# Patient Record
Sex: MALE | Race: BLACK OR AFRICAN AMERICAN | Employment: FULL TIME | URBAN - METROPOLITAN AREA
[De-identification: names, ages, dates, MRNs, and addresses within clinical notes are randomized per-mention and may not be internally consistent; named-entity substitution may affect disease eponyms.]

---

## 2019-01-31 ENCOUNTER — APPOINTMENT (OUTPATIENT)
Dept: GENERAL RADIOLOGY | Age: 39
End: 2019-01-31
Attending: EMERGENCY MEDICINE
Payer: SELF-PAY

## 2019-01-31 ENCOUNTER — HOSPITAL ENCOUNTER (EMERGENCY)
Age: 39
Discharge: HOME | End: 2019-01-31
Attending: EMERGENCY MEDICINE
Payer: SELF-PAY

## 2019-01-31 VITALS
BODY MASS INDEX: 26.84 KG/M2 | HEART RATE: 60 BPM | RESPIRATION RATE: 17 BRPM | WEIGHT: 167 LBS | TEMPERATURE: 98.1 F | OXYGEN SATURATION: 100 % | HEIGHT: 66 IN | SYSTOLIC BLOOD PRESSURE: 125 MMHG | DIASTOLIC BLOOD PRESSURE: 85 MMHG

## 2019-01-31 DIAGNOSIS — S60.221D CONTUSION OF RIGHT HAND, SUBSEQUENT ENCOUNTER: Primary | ICD-10-CM

## 2019-01-31 PROCEDURE — 73130 X-RAY EXAM OF HAND: CPT

## 2019-01-31 PROCEDURE — 99283 EMERGENCY DEPT VISIT LOW MDM: CPT

## 2019-01-31 NOTE — DISCHARGE INSTRUCTIONS
Patient Education        Hand Bruises: Care Instructions  Your Care Instructions  Bruises, or contusions, can happen as a result of an impact or fall. Most people think of a bruise as a black-and-blue spot. This happens when small blood vessels get torn and leak blood under the skin. The bruise may turn purplish black, reddish blue, or yellowish green as it heals. But bones and muscles can also get bruised. This may damage the hand but not cause a bruise that you can see. Most bruises aren't serious and will go away on their own in 2 to 4 weeks. But sometimes a more serious hand injury might not heal on its own. Tell your doctor if you have new symptoms or your injury is not getting better over time. You may have tests to see if you have bone or nerve damage. These tests may include X-rays, a CT scan, or an MRI. If you damaged bones or muscles, you may need more treatment. The doctor has checked you carefully, but problems can develop later. If you notice any problems or new symptoms, get medical treatment right away. Follow-up care is a key part of your treatment and safety. Be sure to make and go to all appointments, and call your doctor if you are having problems. It's also a good idea to know your test results and keep a list of the medicines you take. How can you care for yourself at home? · Put ice or a cold pack on the hand for 10 to 20 minutes at a time. Put a thin cloth between the ice and your skin. · Prop up your hand on a pillow when you ice it or anytime you sit or lie down during the next 3 days. Try to keep your hand above the level of your heart. This will help reduce swelling. · Be safe with medicines. Read and follow all instructions on the label. ? If the doctor gave you a prescription medicine for pain, take it as prescribed. ? If you are not taking a prescription pain medicine, ask your doctor if you can take an over-the-counter medicine.   · Be sure to follow your doctor's advice about moving and exercising your injured hand. When should you call for help? Call your doctor now or seek immediate medical care if:    · Your pain gets worse.     · You have new or worse swelling.     · You have tingling, weakness, or numbness in the area near the bruise.     · The area near the bruise is cold or pale.     · You have symptoms of infection, such as:  ? Increased pain, swelling, warmth, or redness. ? Red streaks leading from the area. ? Pus draining from the area. ? A fever.    Watch closely for changes in your health, and be sure to contact your doctor if:    · You do not get better as expected. Where can you learn more? Go to http://janeen-emi.info/. Enter D655 in the search box to learn more about \"Hand Bruises: Care Instructions. \"  Current as of: September 23, 2018  Content Version: 11.9  © 2420-3285 [employer], [facility]. Care instructions adapted under license by [employer] (which disclaims liability or warranty for this information). If you have questions about a medical condition or this instruction, always ask your healthcare professional. [place] [address] any warranty or liability for your use of this information.

## 2019-01-31 NOTE — ED PROVIDER NOTES
EMERGENCY DEPARTMENT HISTORY AND PHYSICAL EXAM 
 
 
Date: 1/31/2019 Patient Name: Kelly Viveros History of Presenting Illness Chief Complaint Patient presents with  
 Hand Pain  
  pt reported he he hit metal on his rt hand at work and pain since. History Provided By: Patient HPI: Kelly Viveros, 45 y.o. male with no significant PMHx presents ambulatory to the ED for second evaluation constant R hand pain since injuring his hand at work 2 days ago. He states he was pulling on shrink wrap when it gave way and the lateral portion of his hand hit a metal pole. Pt reports noticing the \"bone moving. \" Pt states he was seen at an outside hospital yesterday where he had x-rays that showed no acute fx. However, pt believes they were \"lying to [him]\" because they told him to come back in a week. He reports taking Naproxen without any improvement. Pt notes pain is exacerbated by moving his R pinky finger. He denies any recent numbness/tingling, weakness, fevers, or any other complaints. There are no other complaints, changes, or physical findings at this time. No current facility-administered medications on file prior to encounter. No current outpatient medications on file prior to encounter. Past History Past Medical History: 
History reviewed. No pertinent past medical history. Past Surgical History: 
History reviewed. No pertinent surgical history. Family History: 
History reviewed. No pertinent family history. Social History: 
Social History Tobacco Use  Smoking status: Never Smoker  Smokeless tobacco: Never Used Substance Use Topics  Alcohol use: No  
  Frequency: Never  Drug use: No  
 
 
Allergies: 
No Known Allergies Review of Systems Review of Systems Constitutional: Negative for chills and fever. HENT: Negative for congestion, rhinorrhea, sneezing and sore throat. Eyes: Negative for redness and visual disturbance. Respiratory: Negative for shortness of breath. Cardiovascular: Negative for chest pain and leg swelling. Gastrointestinal: Negative for abdominal pain, nausea and vomiting. Genitourinary: Negative for difficulty urinating and frequency. Musculoskeletal: Negative for back pain, myalgias and neck stiffness. +R hand pain Skin: Negative for rash. Neurological: Negative for dizziness, syncope, weakness and headaches. Hematological: Negative for adenopathy. All other systems reviewed and are negative. Physical Exam  
Physical Exam  
Constitutional: He is oriented to person, place, and time. He appears well-developed and well-nourished. HENT:  
Head: Normocephalic and atraumatic. Mouth/Throat: Oropharynx is clear and moist and mucous membranes are normal.  
Eyes: EOM are normal.  
Neck: Normal range of motion and full passive range of motion without pain. Neck supple. Cardiovascular: Normal rate, regular rhythm, normal heart sounds, intact distal pulses and normal pulses. No murmur heard. Pulmonary/Chest: Effort normal and breath sounds normal. No respiratory distress. He exhibits no tenderness. Abdominal: Soft. Normal appearance and bowel sounds are normal. There is no tenderness. There is no rebound and no guarding. Musculoskeletal:  
TTP over 5th metacarpal and 5th phalanx distal to PIP joint. Mild swelling. No palpable deformity Neurological: He is alert and oriented to person, place, and time. He has normal strength. Skin: Skin is warm, dry and intact. No rash noted. No erythema. Psychiatric: He has a normal mood and affect. His speech is normal and behavior is normal. Judgment and thought content normal.  
Nursing note and vitals reviewed. Diagnostic Study Results Radiologic Studies -  
XR HAND RT MIN 3 V Final Result EXAM: XR HAND RT MIN 3 V 
  INDICATION: right hand injury Tuesday, TTP over little finger from PIP joint to 
 mid metacarpal, fracture? . Right hand pain since injury 2 days ago. 
  
COMPARISON: None. 
  
FINDINGS: Three views of the right hand demonstrate no fracture or other acute 
osseous or articular abnormality. Limited evaluation of the small finger due to 
overlapping fingers on the oblique and lateral view. No evidence of fracture. Joints are within normal limits. Bone mineralization is within normal limits. 
  
IMPRESSION IMPRESSION:  
  
 Limited study. No evidence of fracture. Medical Decision Making I am the first provider for this patient. I reviewed the vital signs, available nursing notes, past medical history, past surgical history, family history and social history. Vital Signs-Reviewed the patient's vital signs. Patient Vitals for the past 12 hrs: 
 Temp Pulse Resp BP SpO2  
01/31/19 0720 98.1 °F (36.7 °C) 60 17 125/85 100 % Records Reviewed: Nursing Notes, Old Medical Records, Previous Radiology Studies and Previous Laboratory Studies Provider Notes (Medical Decision Making): DDx: sprain, strain, fx, contusion ED Course:  
Initial assessment performed. The patients presenting problems have been discussed, and they are in agreement with the care plan formulated and outlined with them. I have encouraged them to ask questions as they arise throughout their visit. 8:00 AM  
Discussed plan. Pt agrees. Will discharge him home with hand surgery f/u. Discharge Note: 
8:11 AM 
The patient has been re-evaluated and is ready for discharge. Reviewed available results with patient. Counseled patient on diagnosis and care plan. Patient has expressed understanding, and all questions have been answered. Patient agrees with plan and agrees to follow up as recommended, or to return to the ED if their symptoms worsen. Discharge instructions have been provided and explained to the patient, along with reasons to return to the ED.  
 
PLAN: 
 1. There are no discharge medications for this patient. 2.  
Follow-up Information Follow up With Specialties Details Why Contact Info Pablo Rosas MD Hand Surgery, General Surgery Schedule an appointment as soon as possible for a visit or with the doctor your worker's comp recommends you use [address] [facility] [facility] 7 [address] [facility] EMERGENCY DEPT Emergency Medicine  As needed, If symptoms worsen [address] Return to ED if worse Diagnosis Clinical Impression: 1. Contusion of right hand, subsequent encounter Attestations: This note is prepared by Shedrick Meigs, acting as Scribe for Wilian Steel MD. 
 
The scribe's documentation has been prepared under my direction and personally reviewed by me in its entirety. I confirm that the note above accurately reflects all work, treatment, procedures, and medical decision making performed by me. Wilian Steel MD 
 
 
 
This note will not be viewable in [address]

## 2019-01-31 NOTE — ED NOTES
Patient presents to the ED with c/o right hand pain x2 days. Pt reports being at work and he pulled on shrink wrap and it gave way and his hand hit something metal. Pt reports taking naproxen. Pt is alert and oriented. Pt skin is warm and dry. Pt is ambulatory independently. Pt right 5th digit has trace swelling. No obvious deformity. Emergency Department Nursing Plan of Care The Nursing Plan of Care is developed from the Nursing assessment and Emergency Department Attending provider initial evaluation. The plan of care may be reviewed in the ED Provider note. The Plan of Care was developed with the following considerations:  
Patient / Family readiness to learn indicated by:verbalized understanding Persons(s) to be included in education: patient Barriers to Learning/Limitations:No 
 
Signed Gladys Hunt 1/31/2019   7:30 AM

## 2022-07-16 ENCOUNTER — HOSPITAL ENCOUNTER (EMERGENCY)
Age: 42
Discharge: HOME | End: 2022-07-16
Attending: EMERGENCY MEDICINE
Payer: MEDICAID

## 2022-07-16 VITALS
TEMPERATURE: 98.2 F | HEART RATE: 77 BPM | RESPIRATION RATE: 17 BRPM | HEIGHT: 67 IN | BODY MASS INDEX: 23.46 KG/M2 | WEIGHT: 149.5 LBS | OXYGEN SATURATION: 98 % | SYSTOLIC BLOOD PRESSURE: 154 MMHG | DIASTOLIC BLOOD PRESSURE: 89 MMHG

## 2022-07-16 DIAGNOSIS — Z20.2 POSSIBLE EXPOSURE TO STD: Primary | ICD-10-CM

## 2022-07-16 DIAGNOSIS — N48.9 PENILE LESION: ICD-10-CM

## 2022-07-16 LAB
APPEARANCE UR: CLEAR
BACTERIA URNS QL MICRO: NEGATIVE /HPF
BILIRUB UR QL: NEGATIVE
COLOR UR: ABNORMAL
EPITH CASTS URNS QL MICRO: ABNORMAL /LPF
GLUCOSE UR STRIP.AUTO-MCNC: NEGATIVE MG/DL
HGB UR QL STRIP: NEGATIVE
KETONES UR QL STRIP.AUTO: ABNORMAL MG/DL
LEUKOCYTE ESTERASE UR QL STRIP.AUTO: ABNORMAL
NITRITE UR QL STRIP.AUTO: NEGATIVE
PH UR STRIP: 5.5 [PH] (ref 5–8)
PROT UR STRIP-MCNC: NEGATIVE MG/DL
RBC #/AREA URNS HPF: ABNORMAL /HPF (ref 0–5)
SP GR UR REFRACTOMETRY: 1.02
UA: UC IF INDICATED,UAUC: ABNORMAL
UROBILINOGEN UR QL STRIP.AUTO: 1 EU/DL (ref 0.2–1)
WBC URNS QL MICRO: ABNORMAL /HPF (ref 0–4)

## 2022-07-16 PROCEDURE — 81001 URINALYSIS AUTO W/SCOPE: CPT

## 2022-07-16 PROCEDURE — 87255 GENET VIRUS ISOLATE HSV: CPT

## 2022-07-16 PROCEDURE — 87491 CHLMYD TRACH DNA AMP PROBE: CPT

## 2022-07-16 PROCEDURE — 74011000250 HC RX REV CODE- 250: Performed by: NURSE PRACTITIONER

## 2022-07-16 PROCEDURE — 96372 THER/PROPH/DIAG INJ SC/IM: CPT

## 2022-07-16 PROCEDURE — 99284 EMERGENCY DEPT VISIT MOD MDM: CPT

## 2022-07-16 PROCEDURE — 74011250636 HC RX REV CODE- 250/636: Performed by: NURSE PRACTITIONER

## 2022-07-16 PROCEDURE — 74011250637 HC RX REV CODE- 250/637: Performed by: NURSE PRACTITIONER

## 2022-07-16 RX ORDER — AZITHROMYCIN 500 MG/1
1000 TABLET, FILM COATED ORAL
Status: COMPLETED | OUTPATIENT
Start: 2022-07-16 | End: 2022-07-16

## 2022-07-16 RX ORDER — ACYCLOVIR 800 MG/1
800 TABLET ORAL
Qty: 35 TABLET | Refills: 0 | Status: SHIPPED | OUTPATIENT
Start: 2022-07-16 | End: 2022-07-23

## 2022-07-16 RX ADMIN — LIDOCAINE HYDROCHLORIDE 500 MG: 10 INJECTION, SOLUTION EPIDURAL; INFILTRATION; INTRACAUDAL; PERINEURAL at 13:50

## 2022-07-16 RX ADMIN — AZITHROMYCIN MONOHYDRATE 1000 MG: 500 TABLET ORAL at 13:50

## 2022-07-16 NOTE — LETTER
7/17/2022      [address] [place] [id number]      Dear Mr. Iwona Vinson were seen in the Emergency Department of [address] [place] [address] on 7-16 and had lab and/or radiology tests performed. We would like to discuss these results with you . Please call the Emergency Department at your earliest convenience at [phone], to speak with one of our providers. The chlamydia from your Emergency Department visit on 7-16 was positive. You were treated appropriately during your visit. No further treatment is required. Your partner needs to be treated. If you have any questions please contact the Emergency Department at [phone].       Sincerely,    Barbara Mckoy NP    [facility] EMERGENCY DEPT  [address] [facility] [id number] [phone]

## 2022-07-16 NOTE — ED PROVIDER NOTES
EMERGENCY DEPARTMENT HISTORY AND PHYSICAL EXAM    Date: 7/16/2022  Patient Name: Annika Holland    History of Presenting Illness     Chief Complaint   Patient presents with    Exposure to STD         History Provided By: Patient    Chief Complaint:penile lesion  Duration: 3 Days  Timing:  Acute  Location: penis  Quality: soreness  Severity: Mild  Modifying Factors: none  Associated Symptoms: denies any other associated signs or symptoms      HPI: Annika Holland is a 43 y.o. male with a PMH of No significant past medical history who presents with   No lesion cute onset 3 days ago. Patient states his partner called him and told him she was tested for everything. Patient states he would like to be treated for STDs. He denies penile discharge. PCP: None    Current Outpatient Medications   Medication Sig Dispense Refill    acyclovir (ZOVIRAX) 800 mg tablet Take 1 Tablet by mouth five (5) times daily for 7 days. 35 Tablet 0       Past History     Past Medical History:  History reviewed. No pertinent past medical history. Past Surgical History:  History reviewed. No pertinent surgical history. Family History:  History reviewed. No pertinent family history. Social History:  Social History     Tobacco Use    Smoking status: Never Smoker    Smokeless tobacco: Never Used   Substance Use Topics    Alcohol use: No    Drug use: No       Allergies:  No Known Allergies      Review of Systems   Review of Systems   Constitutional: Negative for chills, fatigue and fever. HENT: Negative for congestion and sore throat. Eyes: Negative for redness. Respiratory: Negative for cough, chest tightness and wheezing. Cardiovascular: Negative for chest pain. Gastrointestinal: Negative for abdominal pain. Genitourinary: Positive for genital sores. Negative for dysuria. Musculoskeletal: Negative for myalgias, neck pain and neck stiffness. Skin: Negative for rash.    Neurological: Negative for dizziness, syncope, weakness, light-headedness, numbness and headaches. All other systems reviewed and are negative. Physical Exam     Vitals:    07/16/22 1257 07/16/22 1344   BP:  (!) 154/89   Pulse: 77    Resp: 17    Temp: 98.2 °F (36.8 °C)    SpO2: 98%    Weight: 67.8 kg (149 lb 8 oz)    Height: 5' 7\" (1.702 m)      Physical Exam  Vitals and nursing note reviewed. Exam conducted with a chaperone present. Constitutional:       Appearance: Normal appearance. He is well-developed. HENT:      Head: Normocephalic and atraumatic. Right Ear: External ear normal.      Left Ear: External ear normal.      Nose: Nose normal.      Mouth/Throat:      Mouth: Mucous membranes are moist.   Eyes:      General:         Right eye: No discharge. Left eye: No discharge. Conjunctiva/sclera: Conjunctivae normal.   Cardiovascular:      Rate and Rhythm: Normal rate and regular rhythm. Heart sounds: Normal heart sounds. Pulmonary:      Effort: Pulmonary effort is normal. No respiratory distress. Breath sounds: Normal breath sounds. No wheezing. Abdominal:      General: Bowel sounds are normal.      Palpations: Abdomen is soft. Tenderness: There is no abdominal tenderness. Genitourinary:      Musculoskeletal:         General: Normal range of motion. Cervical back: Normal range of motion and neck supple. Lymphadenopathy:      Cervical: No cervical adenopathy. Skin:     General: Skin is warm and dry. Neurological:      Mental Status: He is alert and oriented to person, place, and time. Cranial Nerves: No cranial nerve deficit. Psychiatric:         Behavior: Behavior normal.         Thought Content: Thought content normal.         Judgment: Judgment normal.           Diagnostic Study Results     Labs -   No results found for this or any previous visit (from the past 12 hour(s)).     Radiologic Studies -   No orders to display     CT Results  (Last 48 hours)    None        CXR Results (Last 48 hours)    None            Medical Decision Making   I am the first provider for this patient. I reviewed the vital signs, available nursing notes, past medical history, past surgical history, family history and social history. Vital Signs-Reviewed the patient's vital signs. Records Reviewed: Nursing Notes    Provider Notes (Medical Decision Making):   DDX genital herpes cyst abscess STI ALEXANDRA UTI          Disposition:  home    DISCHARGE NOTE:     I have discussed with patient their diagnosis, treatment, and follow up plan. The patient agrees to follow up as outlined in discharge paperwork and also to return to the ED with any worsening. Odilon Cage NP        Follow-up Information     Follow up With Specialties Details Why Contact Info [facility]  In 1 week  [address] [id number] [phone]          Current Discharge Medication List      START taking these medications    Details   acyclovir (ZOVIRAX) 800 mg tablet Take 1 Tablet by mouth five (5) times daily for 7 days. Qty: 35 Tablet, Refills: 0  Start date: 7/16/2022, End date: 7/23/2022             Procedures:  Procedures    Please note that this dictation was completed with Dragon, computer voice recognition software. Quite often unanticipated grammatical, syntax, homophones, and other interpretive errors are inadvertently transcribed by the computer software. Please disregard these errors. Additionally, please excuse any errors that have escaped final proofreading. Diagnosis     Clinical Impression:   1. Possible exposure to STD    2.  Penile lesion

## 2022-07-16 NOTE — ED TRIAGE NOTES
CC of exposure to STD, pt states that he received a call from partner stating that he has an STD but didn't state what. Pt endorses urinary frequency and penile lesions. Pt denies penile discharge.

## 2022-07-17 LAB
C TRACH DNA SPEC QL NAA+PROBE: POSITIVE
N GONORRHOEA DNA SPEC QL NAA+PROBE: NEGATIVE
SAMPLE TYPE: ABNORMAL
SERVICE CMNT-IMP: ABNORMAL
SPECIMEN SOURCE: ABNORMAL

## 2022-07-19 LAB
HSV SPEC CULT: NEGATIVE
SPECIMEN SOURCE: NORMAL

## 2022-11-15 ENCOUNTER — HOSPITAL ENCOUNTER (EMERGENCY)
Age: 42
Discharge: HOME | End: 2022-11-15
Attending: EMERGENCY MEDICINE
Payer: MEDICAID

## 2022-11-15 VITALS
SYSTOLIC BLOOD PRESSURE: 120 MMHG | HEIGHT: 67 IN | WEIGHT: 154.1 LBS | TEMPERATURE: 98.2 F | OXYGEN SATURATION: 99 % | DIASTOLIC BLOOD PRESSURE: 83 MMHG | HEART RATE: 65 BPM | BODY MASS INDEX: 24.19 KG/M2 | RESPIRATION RATE: 16 BRPM

## 2022-11-15 DIAGNOSIS — L03.012 PARONYCHIA OF FINGER OF LEFT HAND: Primary | ICD-10-CM

## 2022-11-15 PROCEDURE — 74011250637 HC RX REV CODE- 250/637: Performed by: PHYSICIAN ASSISTANT

## 2022-11-15 PROCEDURE — 99283 EMERGENCY DEPT VISIT LOW MDM: CPT

## 2022-11-15 PROCEDURE — 74011000250 HC RX REV CODE- 250: Performed by: PHYSICIAN ASSISTANT

## 2022-11-15 PROCEDURE — 75810000289 HC I&D ABSCESS SIMP/COMP/MULT

## 2022-11-15 RX ORDER — SULFAMETHOXAZOLE AND TRIMETHOPRIM 800; 160 MG/1; MG/1
1 TABLET ORAL 2 TIMES DAILY
Qty: 14 TABLET | Refills: 0 | Status: SHIPPED | OUTPATIENT
Start: 2022-11-15 | End: 2022-11-22

## 2022-11-15 RX ORDER — BACITRACIN 500 UNIT/G
1 PACKET (EA) TOPICAL
Status: COMPLETED | OUTPATIENT
Start: 2022-11-15 | End: 2022-11-15

## 2022-11-15 RX ORDER — LIDOCAINE HYDROCHLORIDE 10 MG/ML
1 INJECTION, SOLUTION EPIDURAL; INFILTRATION; INTRACAUDAL; PERINEURAL ONCE
Status: COMPLETED | OUTPATIENT
Start: 2022-11-15 | End: 2022-11-15

## 2022-11-15 RX ORDER — HYDROCODONE BITARTRATE AND ACETAMINOPHEN 5; 325 MG/1; MG/1
1 TABLET ORAL
Status: COMPLETED | OUTPATIENT
Start: 2022-11-15 | End: 2022-11-15

## 2022-11-15 RX ORDER — NAPROXEN 500 MG/1
500 TABLET ORAL 2 TIMES DAILY WITH MEALS
Qty: 20 TABLET | Refills: 0 | Status: SHIPPED | OUTPATIENT
Start: 2022-11-15

## 2022-11-15 RX ORDER — CEPHALEXIN 500 MG/1
500 CAPSULE ORAL 4 TIMES DAILY
Qty: 28 CAPSULE | Refills: 0 | Status: SHIPPED | OUTPATIENT
Start: 2022-11-15 | End: 2022-11-22

## 2022-11-15 RX ORDER — SULFAMETHOXAZOLE AND TRIMETHOPRIM 800; 160 MG/1; MG/1
1 TABLET ORAL
Status: COMPLETED | OUTPATIENT
Start: 2022-11-15 | End: 2022-11-15

## 2022-11-15 RX ORDER — CEPHALEXIN 500 MG/1
500 CAPSULE ORAL
Status: COMPLETED | OUTPATIENT
Start: 2022-11-15 | End: 2022-11-15

## 2022-11-15 RX ADMIN — LIDOCAINE HYDROCHLORIDE 1 ML: 10 INJECTION, SOLUTION EPIDURAL; INFILTRATION; INTRACAUDAL; PERINEURAL at 21:41

## 2022-11-15 RX ADMIN — BACITRACIN 1 PACKET: 500 OINTMENT TOPICAL at 22:07

## 2022-11-15 RX ADMIN — HYDROCODONE BITARTRATE AND ACETAMINOPHEN 1 TABLET: 5; 325 TABLET ORAL at 22:07

## 2022-11-15 RX ADMIN — SULFAMETHOXAZOLE AND TRIMETHOPRIM 1 TABLET: 800; 160 TABLET ORAL at 21:41

## 2022-11-15 RX ADMIN — CEPHALEXIN 500 MG: 500 CAPSULE ORAL at 21:41

## 2022-11-15 NOTE — Clinical Note
[address] EMERGENCY DEPT  [address] [facility] [id number] [phone]    Work/School Note    Date: 11/15/2022    To Whom It May concern:    Shelly Dietz was seen and treated today in the emergency room by the following provider(s):  Attending Provider: Tana Potts MD  Physician Assistant: Aguilar Francois. Shelly Dietz is excused from work/school on 11/15/22 and 11/16/22. He is medically clear to return to work/school on 11/17/2022.        Sincerely,          PHILL Kaur

## 2022-11-16 NOTE — ED TRIAGE NOTES
Pt appears to have paronychia around left index finger. Pt states he does bite his nails. Pt denies taking medication for pain.

## 2022-11-16 NOTE — ED PROVIDER NOTES
EMERGENCY DEPARTMENT HISTORY AND PHYSICAL EXAM      Date: 11/15/2022  Patient Name: Angela Quiroz    History of Presenting Illness     Chief Complaint   Patient presents with    Finger Pain     With swelling x2 days. Left index finger       History Provided By: Patient    HPI: Angela Quiroz, 43 y.o. male otherwise healthy with no reported chronic medical conditions, presents  to the ED with cc of mild to moderate, constant left index finger pain and swelling for the past 3 days. Endorses mild to moderate, constant aching and throbbing pain, localized to the left distal finger. Pain is worse with palpation. Denies any drainage or bleeding. Denies decrease in ROM. Denies numbness or weakness. Denies fevers or chills. Denies trauma or injuries, but states he does bite his nails. Denies history of diabetes, HIV or IV drug use. No symptomatic management prior to arrival. No additional exacerbating or alleviating factors. No other complaints at this time. There are no other complaints, changes, or physical findings at this time. PCP: None      Past History     Past Medical History:  History reviewed. No pertinent past medical history. Past Surgical History:  No past surgical history on file. Family History:  History reviewed. No pertinent family history. Social History:  Social History     Tobacco Use    Smoking status: Never    Smokeless tobacco: Never   Substance Use Topics    Alcohol use: No    Drug use: No       Allergies:  No Known Allergies  Review of Systems   Review of Systems   Constitutional:  Negative for appetite change, chills and fever. HENT:  Negative for congestion. Eyes:  Negative for pain. Respiratory:  Negative for cough and shortness of breath. Cardiovascular:  Negative for chest pain. Gastrointestinal:  Negative for abdominal pain, constipation, diarrhea, nausea and vomiting. Genitourinary:  Negative for difficulty urinating, dysuria and frequency. Musculoskeletal:  Positive for arthralgias. Skin:  Negative for rash. Swelling to end of left index finger around nail   Neurological:  Negative for syncope and headaches. All other systems reviewed and are negative. Physical Exam   Physical Exam  Vitals and nursing note reviewed. Constitutional:       General: He is not in acute distress. Appearance: Normal appearance. He is not ill-appearing or toxic-appearing. Comments: 43 y.o. male   HENT:      Head: Normocephalic and atraumatic. Right Ear: External ear normal.      Left Ear: External ear normal.      Nose: Nose normal.      Mouth/Throat:      Mouth: Mucous membranes are moist.   Eyes:      Extraocular Movements: Extraocular movements intact. Conjunctiva/sclera: Conjunctivae normal.   Cardiovascular:      Rate and Rhythm: Normal rate. Pulses: Normal pulses. Pulmonary:      Effort: Pulmonary effort is normal. No respiratory distress. Abdominal:      General: Abdomen is flat. There is no distension. Musculoskeletal:         General: Normal range of motion. Cervical back: Normal range of motion. Comments: Paronychia to left index finger. No fat pad swelling or evidence of felon. Distal sensation equal and intact. Brisk capillary refill. Good range of motion. No active drainage or bleeding. No streaking. Strong radial pulse. Good strength to left hand. Skin:     General: Skin is warm and dry. Neurological:      General: No focal deficit present. Mental Status: He is alert. Psychiatric:         Mood and Affect: Mood normal.         Behavior: Behavior normal.     Diagnostic Study Results     Labs -   No results found for this or any previous visit (from the past 12 hour(s)). Radiologic Studies -   No orders to display     CT Results  (Last 48 hours)      None          CXR Results  (Last 48 hours)      None          Medical Decision Making   I am the first provider for this patient.     I reviewed the vital signs, available nursing notes, past medical history, past surgical history, family history and social history. Vital Signs-Reviewed the patient's vital signs. Patient Vitals for the past 12 hrs:   Temp Pulse Resp BP SpO2   11/15/22 1930 98.2 °F (36.8 °C) 65 16 120/83 99 %           Records Reviewed: Nursing Notes and Old Medical Records    Provider Notes (Medical Decision Making):   Patient presents ED for evaluation of left index finger pain as noted above. History and physical exam consistent with paronychia. I&D performed, patient tolerated well. Neurovascularly intact, range of motion intact. Will place on Keflex and Bactrim. No evidence of felon, deep space infection, necrotizing fasciitis, osteomyelitis, or other emergent conditions requiring further evaluation/management acutely here at this time. Shared decision-making form and care plan created together, discussed diagnosis and treatment plan. Counseled additional symptomatic management techniques. PCP follow-up. Hand specialist follow-up. Verbal return precautions advised. Patient verbalizes understanding and agreement of current plan of care. ED Course:   Initial assessment performed. The patients presenting problems have been discussed, and they are in agreement with the care plan formulated and outlined with them. I have encouraged them to ask questions as they arise throughout their visit. Procedure Note - Incision and Drainage:   9:59 PM  Performed by: Davonte Erazo PA-C  Verbal Consent obtained and witnessed by RN   Complexity: simple   (Note: Complex drainage include wounds that: 1. involve multiple abscesses, 2. Are probed to break up loculations or 3. Are packed after drainage.)  Skin prepped with Betadine. Sterile field established. Anesthesia achieved using a local infiltration of 0.5 mL lidocaine 1% without epinephrine.    Paronychia was incised with # 11 blade, and mild to moderate amount of purulent drainage was expressed. Sterile dressing applied. Estimated blood loss: minimal  The procedure took 1-15 minutes, and pt tolerated well. Disposition:  Discharge     PLAN:  1. Discharge Medication List as of 11/15/2022 10:04 PM        START taking these medications    Details   cephALEXin (Keflex) 500 mg capsule Take 1 Capsule by mouth four (4) times daily for 7 days. , Normal, Disp-28 Capsule, R-0      trimethoprim-sulfamethoxazole (Bactrim DS) 160-800 mg per tablet Take 1 Tablet by mouth two (2) times a day for 7 days. , Normal, Disp-14 Tablet, R-0      naproxen (NAPROSYN) 500 mg tablet Take 1 Tablet by mouth two (2) times daily (with meals). , Normal, Disp-20 Tablet, R-0           2. Follow-up Information       Follow up With Specialties Details Why [address] [facility] EMERGENCY DEPT Emergency Medicine  As needed, If symptoms worsen [address]    Jamshid Hinojosa MD Internal Medicine Physician In 1 week  [address]  [address] [place] [address]      Adrian Aguillon MD Hand Surgery Physician, General Surgery In 1 week  [address] [address] [phone]            Return to ED if worse     Diagnosis     Clinical Impression:   1.  Paronychia of finger of left hand

## 2022-11-16 NOTE — ED NOTES
Emergency Department Nursing Plan of Care       The Nursing Plan of Care is developed from the Nursing assessment and Emergency Department Attending provider initial evaluation. The plan of care may be reviewed in the ED Provider note.     The Plan of Care was developed with the following considerations:   Patient / Family readiness to learn indicated by:verbalized understanding  Persons(s) to be included in education: patient  Barriers to Learning/Limitations:No    Signed     Levon Enriquez    11/15/2022   9:04 PM

## 2024-01-04 ENCOUNTER — APPOINTMENT (OUTPATIENT)
Facility: HOSPITAL | Age: 44
End: 2024-01-04

## 2024-01-04 ENCOUNTER — HOSPITAL ENCOUNTER (EMERGENCY)
Facility: HOSPITAL | Age: 44
Discharge: HOME | End: 2024-01-04

## 2024-01-04 VITALS
TEMPERATURE: 98.5 F | RESPIRATION RATE: 16 BRPM | HEART RATE: 90 BPM | OXYGEN SATURATION: 97 % | WEIGHT: 143 LBS | HEIGHT: 67 IN | BODY MASS INDEX: 22.44 KG/M2 | DIASTOLIC BLOOD PRESSURE: 63 MMHG | SYSTOLIC BLOOD PRESSURE: 118 MMHG
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
## 2024-01-04 DIAGNOSIS — S16.1XXA STRAIN OF NECK MUSCLE, INITIAL ENCOUNTER: ICD-10-CM

## 2024-01-04 DIAGNOSIS — M25.512 ACUTE PAIN OF LEFT SHOULDER: ICD-10-CM

## 2024-01-04 DIAGNOSIS — M79.642 LEFT HAND PAIN: ICD-10-CM

## 2024-01-04 PROCEDURE — 72100 X-RAY EXAM L-S SPINE 2/3 VWS: CPT

## 2024-01-04 PROCEDURE — 99284 EMERGENCY DEPT VISIT MOD MDM: CPT

## 2024-01-04 PROCEDURE — 72125 CT NECK SPINE W/O DYE: CPT

## 2024-01-04 PROCEDURE — 73030 X-RAY EXAM OF SHOULDER: CPT

## 2024-01-04 PROCEDURE — 73130 X-RAY EXAM OF HAND: CPT

## 2024-01-04 PROCEDURE — 72072 X-RAY EXAM THORAC SPINE 3VWS: CPT

## 2024-01-04 PROCEDURE — 6370000000 HC RX 637 (ALT 250 FOR IP)

## 2024-01-04 RX ORDER — METHOCARBAMOL 750 MG/1
750 TABLET, FILM COATED ORAL 4 TIMES DAILY
Qty: 40 TABLET | Refills: 0 | Status: SHIPPED | OUTPATIENT
Start: 2024-01-04 | End: 2024-01-14

## 2024-01-04 RX ORDER — NAPROXEN 500 MG/1
500 TABLET ORAL 2 TIMES DAILY WITH MEALS
Qty: 30 TABLET | Refills: 0 | Status: SHIPPED | OUTPATIENT
Start: 2024-01-04

## 2024-01-04 RX ORDER — METHOCARBAMOL 500 MG/1
750 TABLET, FILM COATED ORAL
Status: COMPLETED | OUTPATIENT
Start: 2024-01-04 | End: 2024-01-04

## 2024-01-04 RX ORDER — ACETAMINOPHEN 325 MG/1
650 TABLET ORAL
Status: COMPLETED | OUTPATIENT
Start: 2024-01-04 | End: 2024-01-04

## 2024-01-04 RX ORDER — KETOROLAC TROMETHAMINE 30 MG/ML
15 INJECTION, SOLUTION INTRAMUSCULAR; INTRAVENOUS ONCE
Status: DISCONTINUED | OUTPATIENT
Start: 2024-01-04 | End: 2024-01-04 | Stop reason: HOSPADM

## 2024-01-04 RX ADMIN — ACETAMINOPHEN 650 MG: 325 TABLET ORAL at 18:45

## 2024-01-04 RX ADMIN — METHOCARBAMOL 750 MG: 500 TABLET ORAL at 18:45

## 2024-01-04 ASSESSMENT — PAIN DESCRIPTION - ORIENTATION
ORIENTATION: LEFT
ORIENTATION: LEFT

## 2024-01-04 ASSESSMENT — PAIN SCALES - GENERAL
PAINLEVEL_OUTOF10: 7
PAINLEVEL_OUTOF10: 7

## 2024-01-04 ASSESSMENT — PAIN DESCRIPTION - DESCRIPTORS: DESCRIPTORS: ACHING

## 2024-01-04 ASSESSMENT — PAIN - FUNCTIONAL ASSESSMENT: PAIN_FUNCTIONAL_ASSESSMENT: 0-10

## 2024-01-04 ASSESSMENT — PAIN DESCRIPTION - LOCATION
LOCATION: ARM
LOCATION: SHOULDER
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
## 2024-01-04 NOTE — ED PROVIDER NOTES
[facility], [facility] [address]      Phone: [phone]   methocarbamol 750 MG tablet  naproxen 500 MG tablet           DISCONTINUED MEDICATIONS:  Current Discharge Medication List        I have seen and evaluated the patient autonomously. My supervision physician was on site and available for consultation if needed.     I am the Primary Clinician of Record.   Zulay Carr PA-C (electronically signed)    (Please note that parts of this dictation were completed with voice recognition software. Quite often unanticipated grammatical, syntax, homophones, and other interpretive errors are inadvertently transcribed by the computer software. Please disregards these errors. Please excuse any errors that have escaped final proofreading.)       Zulay Carr PA-C  01/04/24 2001
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
## 2024-01-09 ENCOUNTER — TELEPHONE (OUTPATIENT)
Age: 44
End: 2024-01-09

## 2024-01-09 NOTE — TELEPHONE ENCOUNTER
Called the patient and offered to schedule him an ED F/U apt for his left shoulder and left hand however he stated he would have to call back to schedule as he was on the other line with his insurance company.

## 2024-01-11 ENCOUNTER — OFFICE VISIT (OUTPATIENT)
Age: 44
End: 2024-01-11
Payer: MEDICAID

## 2024-01-11 VITALS
WEIGHT: 149 LBS | SYSTOLIC BLOOD PRESSURE: 97 MMHG | HEIGHT: 67 IN | DIASTOLIC BLOOD PRESSURE: 63 MMHG | OXYGEN SATURATION: 98 % | HEART RATE: 76 BPM | RESPIRATION RATE: 18 BRPM | BODY MASS INDEX: 23.39 KG/M2 | TEMPERATURE: 98 F

## 2024-01-11 DIAGNOSIS — S46.912A STRAIN OF LEFT SHOULDER, INITIAL ENCOUNTER: ICD-10-CM

## 2024-01-11 DIAGNOSIS — M25.522 LEFT ELBOW PAIN: Primary | ICD-10-CM

## 2024-01-11 PROCEDURE — 99204 OFFICE O/P NEW MOD 45 MIN: CPT | Performed by: ORTHOPAEDIC SURGERY

## 2024-01-11 RX ORDER — TIZANIDINE HYDROCHLORIDE 4 MG/1
4 CAPSULE, GELATIN COATED ORAL 3 TIMES DAILY PRN
Qty: 90 CAPSULE | Refills: 0 | Status: SHIPPED | OUTPATIENT
Start: 2024-01-11

## 2024-01-11 RX ORDER — DICLOFENAC SODIUM 75 MG/1
75 TABLET, DELAYED RELEASE ORAL 2 TIMES DAILY
Qty: 60 TABLET | Refills: 3 | Status: SHIPPED | OUTPATIENT
Start: 2024-01-11

## 2024-01-11 ASSESSMENT — PATIENT HEALTH QUESTIONNAIRE - PHQ9
SUM OF ALL RESPONSES TO PHQ QUESTIONS 1-9: 0
SUM OF ALL RESPONSES TO PHQ QUESTIONS 1-9: 0
1. LITTLE INTEREST OR PLEASURE IN DOING THINGS: 0
2. FEELING DOWN, DEPRESSED OR HOPELESS: 0
SUM OF ALL RESPONSES TO PHQ QUESTIONS 1-9: 0
SUM OF ALL RESPONSES TO PHQ QUESTIONS 1-9: 0
SUM OF ALL RESPONSES TO PHQ9 QUESTIONS 1 & 2: 0
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
## 2024-01-11 NOTE — PROGRESS NOTES
Room:   I have reviewed all needed documentation in preparation for visit. Verified patient by name and date of birth  Chief Complaint   Patient presents with    Shoulder Injury     left    Elbow Injury     left       Vitals:    01/11/24 0949   BP: 97/63   Site: Right Upper Arm   Position: Sitting   Cuff Size: Medium Adult   Pulse: 76   Resp: 18   Temp: 98 °F (36.7 °C)   TempSrc: Oral   SpO2: 98%   Weight: 67.6 kg (149 lb)   Height: 1.702 m (5' 7\")        Pain Score:   8    Health Maintenance Review: Patient reminded of \"due or due soon\" health maintenance. I have asked the patient to contact his/her primary care provider (PCP) for follow-up on his/her health maintenance.    \"Have you been to the ER, urgent care clinic since your last visit?  Hospitalized since your last visit?\"    NO    “Have you seen or consulted any other health care providers outside of [facility] since your last visit?”    NO

## 2024-02-12 ENCOUNTER — TELEPHONE (OUTPATIENT)
Age: 44
End: 2024-02-12

## 2024-02-14 ENCOUNTER — OFFICE VISIT (OUTPATIENT)
Age: 44
End: 2024-02-14
Payer: MEDICAID

## 2024-02-14 VITALS
WEIGHT: 151.6 LBS | OXYGEN SATURATION: 100 % | TEMPERATURE: 97.6 F | HEIGHT: 67 IN | HEART RATE: 60 BPM | DIASTOLIC BLOOD PRESSURE: 76 MMHG | BODY MASS INDEX: 23.79 KG/M2 | SYSTOLIC BLOOD PRESSURE: 128 MMHG | RESPIRATION RATE: 18 BRPM

## 2024-02-14 DIAGNOSIS — S46.912D STRAIN OF LEFT SHOULDER, SUBSEQUENT ENCOUNTER: Primary | ICD-10-CM

## 2024-02-14 DIAGNOSIS — S46.912A STRAIN OF LEFT SHOULDER, INITIAL ENCOUNTER: ICD-10-CM

## 2024-02-14 PROCEDURE — 99212 OFFICE O/P EST SF 10 MIN: CPT | Performed by: ORTHOPAEDIC SURGERY

## 2024-02-14 NOTE — PROGRESS NOTES
Identified pt with two pt identifiers (name and ). Reviewed chart in preparation for visit and have obtained necessary documentation.    Kumar Escobar is a 44 y.o. male Shoulder Pain (Follow up left elbow and left shoulder pain)  .    Vitals:    24 0849   BP: 128/76   Site: Right Upper Arm   Position: Sitting   Cuff Size: Medium Adult   Pulse: 60   Resp: 18   Temp: 97.6 °F (36.4 °C)   TempSrc: Oral   SpO2: 100%   Weight: 68.8 kg (151 lb 9.6 oz)   Height: 1.702 m (5' 7\")          1. Have you been to the ER, urgent care clinic since your last visit?  Hospitalized since your last visit?  no     2. Have you seen or consulted any other health care providers outside of the [facility] since your last visit?  Include any pap smears or colon screening.  no  
depression, sleep pattern changes, anxiety, change in personality  Endocrine: Denies mood swings, heat or cold intolerance  Hematologic/Lymphatic: Denies anemia, purpura, petechia  Allergic/Immunologic: Denies swelling of throat, pain or swelling at lymph nodes      Physical Examination:    Vitals:    02/14/24 0849   BP: 128/76   Pulse: 60   Resp: 18   Temp: 97.6 °F (36.4 °C)   SpO2: 100%        General: AOX3, no apparent distress  Psychiatric: mood and affect appropriate  Lungs: breathing is symmetric and unlabored bilaterally  Heart: regular rate and rhythm  Abdomen: no guarding  Head: normocephalic, atraumatic  Skin: No significant abnormalities, good turgor  Sensation intact to light touch: C5-T1 dermatomes  Muscular exam: 5/5 strength in all major muscle groups unless noted in specialty exam.    Extremities        Left upper extremity: Active range of motion now to 90 degrees of forward flexion and abduction, significant improvement.  No mechanical block to motion.  Right upper extremity: Extremity        Diagnostics:    Pertinent Diagnostics: **    Assessment: Strain right shoulder  Plan:    This patient has above-mentioned issue, due to the improvement that he is having, I will continue his physical therapy, plan be to have him proceed with this for another month, have given him work restrictions, follow-up in 1 month for clinical check and likely return to work.      Mr. Escobar has a reminder for a \"due or due soon\" health maintenance. I have asked that he contact his primary care provider for follow-up on this health maintenance.

## 2024-03-11 ENCOUNTER — HOSPITAL ENCOUNTER (OUTPATIENT)
Facility: HOSPITAL | Age: 44
Discharge: HOME | End: 2024-03-14
Payer: COMMERCIAL

## 2024-03-11 ENCOUNTER — TRANSCRIBE ORDERS (OUTPATIENT)
Facility: HOSPITAL | Age: 44
End: 2024-03-11

## 2024-03-11 DIAGNOSIS — R07.89 ATYPICAL CHEST PAIN: Primary | ICD-10-CM

## 2024-03-11 DIAGNOSIS — R07.89 ATYPICAL CHEST PAIN: ICD-10-CM

## 2024-03-11 PROCEDURE — 71046 X-RAY EXAM CHEST 2 VIEWS: CPT

## 2024-03-14 ENCOUNTER — OFFICE VISIT (OUTPATIENT)
Age: 44
End: 2024-03-14
Payer: COMMERCIAL

## 2024-03-14 VITALS
BODY MASS INDEX: 24.08 KG/M2 | HEIGHT: 67 IN | SYSTOLIC BLOOD PRESSURE: 114 MMHG | WEIGHT: 153.4 LBS | DIASTOLIC BLOOD PRESSURE: 73 MMHG | OXYGEN SATURATION: 100 % | RESPIRATION RATE: 18 BRPM | HEART RATE: 50 BPM

## 2024-03-14 DIAGNOSIS — S46.912A STRAIN OF LEFT SHOULDER, INITIAL ENCOUNTER: Primary | ICD-10-CM

## 2024-03-14 PROCEDURE — 99212 OFFICE O/P EST SF 10 MIN: CPT | Performed by: ORTHOPAEDIC SURGERY

## 2024-03-14 RX ORDER — ERGOCALCIFEROL 1.25 MG/1
50000 CAPSULE ORAL DAILY
COMMUNITY
Start: 2024-03-08

## 2024-03-14 ASSESSMENT — PATIENT HEALTH QUESTIONNAIRE - PHQ9
1. LITTLE INTEREST OR PLEASURE IN DOING THINGS: 0
SUM OF ALL RESPONSES TO PHQ QUESTIONS 1-9: 1
SUM OF ALL RESPONSES TO PHQ QUESTIONS 1-9: 1
SUM OF ALL RESPONSES TO PHQ9 QUESTIONS 1 & 2: 1
SUM OF ALL RESPONSES TO PHQ QUESTIONS 1-9: 1
SUM OF ALL RESPONSES TO PHQ QUESTIONS 1-9: 1
2. FEELING DOWN, DEPRESSED OR HOPELESS: 1

## 2024-03-14 NOTE — PROGRESS NOTES
3/14/2024      CC: Left shoulder pain    HPI:      This is a 44 y.o. year old male who presents for a follow up visit.  The patient was last seen and diagnosed with during left shoulder.   The patient's treatments since the most recent visit have comprised of sickle therapy.   The patient has had moderate relief of the chief complaint.        PMH:  No past medical history on file.    PSxHx:  No past surgical history on file.    Meds:    Current Outpatient Medications:     ergocalciferol (ERGOCALCIFEROL) 1.25 MG (00798 UT) capsule, Take 1 capsule by mouth daily For 28 days, Disp: , Rfl:     tiZANidine (ZANAFLEX) 4 MG capsule, Take 1 capsule by mouth 3 times daily as needed for Muscle spasms, Disp: 90 capsule, Rfl: 0    diclofenac (VOLTAREN) 75 MG EC tablet, Take 1 tablet by mouth 2 times daily (Patient not taking: Reported on 3/14/2024), Disp: 60 tablet, Rfl: 3    naproxen (NAPROSYN) 500 MG tablet, Take 1 tablet by mouth 2 times daily (with meals) (Patient not taking: Reported on 3/14/2024), Disp: 30 tablet, Rfl: 0    All:  No Known Allergies    Social Hx:  Social History     Socioeconomic History    Marital status: Single     Spouse name: None    Number of children: None    Years of education: None    Highest education level: None   Tobacco Use    Smoking status: Never    Smokeless tobacco: Never   Substance and Sexual Activity    Alcohol use: No    Drug use: Not Currently     Types: Marijuana (Weed)       Family Hx:  No family history on file.      Review of Systems:       General: Denies headache, lethargy, fever, weight loss  Ears/Nose/Throat: Denies ear discharge, drainage, nosebleeds, hoarse voice, dental problems  Cardiovascular: Denies chest pain, shortness of breath  Lungs: Denies chest pain, breathing problems, wheezing, pneumonia  Stomach: Denies stomach pain, heartburn, constipation, irritable bowel  Skin: Denies rash, sores, open wounds  Musculoskeletal: Left shoulder pain  Genitourinary: Denies dysuria,

## 2024-03-14 NOTE — PROGRESS NOTES
Identified pt with two pt identifiers (name and ). Reviewed chart in preparation for visit and have obtained necessary documentation.    Kumar Escobar is a 44 y.o. male Shoulder Pain (F/u lt shoulder pain )  .    Vitals:    24 0821   BP: 114/73   Site: Right Upper Arm   Position: Sitting   Cuff Size: Medium Adult   Pulse: 50   Resp: 18   SpO2: 100%   Weight: 69.6 kg (153 lb 6.4 oz)   Height: 1.702 m (5' 7\")          1. Have you been to the ER, urgent care clinic since your last visit?  Hospitalized since your last visit?  yes - 2024- pt went to hospital for chest xray for chest pain.      2. Have you seen or consulted any other health care providers outside of the [facility] since your last visit?  Include any pap smears or colon screening.  no

## 2024-03-25 ENCOUNTER — TELEPHONE (OUTPATIENT)
Age: 44
End: 2024-03-25
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
## 2024-04-02 ENCOUNTER — HOSPITAL ENCOUNTER (EMERGENCY)
Facility: HOSPITAL | Age: 44
Discharge: HOME | End: 2024-04-02
Payer: COMMERCIAL

## 2024-04-02 VITALS
HEIGHT: 67 IN | HEART RATE: 60 BPM | SYSTOLIC BLOOD PRESSURE: 111 MMHG | BODY MASS INDEX: 24.17 KG/M2 | OXYGEN SATURATION: 100 % | DIASTOLIC BLOOD PRESSURE: 68 MMHG | RESPIRATION RATE: 16 BRPM | TEMPERATURE: 97.8 F | WEIGHT: 154 LBS

## 2024-04-02 DIAGNOSIS — Z20.2 EXPOSURE TO STD: Primary | ICD-10-CM

## 2024-04-02 LAB
APPEARANCE UR: CLEAR
BACTERIA URNS QL MICRO: NEGATIVE /HPF
BILIRUB UR QL: NEGATIVE
COLOR UR: NORMAL
EPITH CASTS URNS QL MICRO: NORMAL /LPF
GLUCOSE UR STRIP.AUTO-MCNC: NEGATIVE MG/DL
HGB UR QL STRIP: NEGATIVE
KETONES UR QL STRIP.AUTO: NEGATIVE MG/DL
LEUKOCYTE ESTERASE UR QL STRIP.AUTO: NEGATIVE
NITRITE UR QL STRIP.AUTO: NEGATIVE
PH UR STRIP: 7.5 (ref 5–8)
PROT UR STRIP-MCNC: NEGATIVE MG/DL
RBC #/AREA URNS HPF: NORMAL /HPF (ref 0–5)
SP GR UR REFRACTOMETRY: 1.01
URINE CULTURE IF INDICATED: NORMAL
UROBILINOGEN UR QL STRIP.AUTO: 1 EU/DL (ref 0.2–1)
WBC URNS QL MICRO: NORMAL /HPF (ref 0–4)

## 2024-04-02 PROCEDURE — 2500000003 HC RX 250 WO HCPCS

## 2024-04-02 PROCEDURE — 6370000000 HC RX 637 (ALT 250 FOR IP)

## 2024-04-02 PROCEDURE — 99284 EMERGENCY DEPT VISIT MOD MDM: CPT

## 2024-04-02 PROCEDURE — 96372 THER/PROPH/DIAG INJ SC/IM: CPT

## 2024-04-02 PROCEDURE — 81001 URINALYSIS AUTO W/SCOPE: CPT

## 2024-04-02 PROCEDURE — 6360000002 HC RX W HCPCS

## 2024-04-02 PROCEDURE — 87591 N.GONORRHOEAE DNA AMP PROB: CPT

## 2024-04-02 PROCEDURE — 87491 CHLMYD TRACH DNA AMP PROBE: CPT

## 2024-04-02 RX ORDER — AZITHROMYCIN 500 MG/1
1000 TABLET, FILM COATED ORAL
Status: COMPLETED | OUTPATIENT
Start: 2024-04-02 | End: 2024-04-02

## 2024-04-02 RX ADMIN — AZITHROMYCIN 1000 MG: 500 TABLET, FILM COATED ORAL at 13:35

## 2024-04-02 RX ADMIN — LIDOCAINE HYDROCHLORIDE 500 MG: 10 INJECTION, SOLUTION EPIDURAL; INFILTRATION; INTRACAUDAL; PERINEURAL at 13:36

## 2024-04-02 ASSESSMENT — PAIN SCALES - GENERAL: PAINLEVEL_OUTOF10: 0

## 2024-04-02 ASSESSMENT — PAIN - FUNCTIONAL ASSESSMENT: PAIN_FUNCTIONAL_ASSESSMENT: 0-10

## 2024-04-02 NOTE — ED PROVIDER NOTES
NEG      Urobilinogen, Urine 1.0 0.2 - 1.0 EU/dL    Nitrite, Urine Negative NEG      Leukocyte Esterase, Urine Negative NEG      WBC, UA 0-4 0 - 4 /hpf    RBC, UA 0-5 0 - 5 /hpf    Epithelial Cells UA FEW FEW /lpf    BACTERIA, URINE Negative NEG /hpf    Urine Culture if Indicated CULTURE NOT INDICATED BY UA RESULT CNI            ED Course as of 04/02/24 1411   Tue Apr 02, 2024   1406 Urine with signs of infection.  Patient was provided empiric treatment here in the ED for chlamydia and gonorrhea.  Discharged with above plan. [LK]      ED Course User Index  [LK] Zulay Carr PA-C           FINAL IMPRESSION     1. Exposure to STD          DISPOSITION/PLAN   DISPOSITION Decision To Discharge 04/02/2024 02:06:58 PM           PATIENT REFERRED TO:  [facility]  [address] [place] [address]  [phone]  Schedule an appointment as soon as possible for a visit       [facility] EMERGENCY DEPT  [address] [place] [address]  [phone]    As needed, If symptoms worsen        DISCHARGE MEDICATIONS:     Medication List        ASK your doctor about these medications      diclofenac 75 MG EC tablet  Commonly known as: VOLTAREN  Take 1 tablet by mouth 2 times daily     ergocalciferol 1.25 MG (94593 UT) capsule  Commonly known as: ERGOCALCIFEROL     naproxen 500 MG tablet  Commonly known as: NAPROSYN  Take 1 tablet by mouth 2 times daily (with meals)     tiZANidine 4 MG capsule  Commonly known as: ZANAFLEX  Take 1 capsule by mouth 3 times daily as needed for Muscle spasms                DISCONTINUED MEDICATIONS:  Current Discharge Medication List          I am the Primary Clinician of Record.     Zulay Carr PA-C (electronically signed)    (Please note that parts of this dictation were completed with voice recognition software. Quite often unanticipated grammatical, syntax, homophones, and other interpretive errors are inadvertently transcribed by the computer software. Please

## 2024-04-11 ENCOUNTER — OFFICE VISIT (OUTPATIENT)
Age: 44
End: 2024-04-11
Payer: COMMERCIAL

## 2024-04-11 VITALS — WEIGHT: 154 LBS | HEIGHT: 67 IN | BODY MASS INDEX: 24.17 KG/M2

## 2024-04-11 DIAGNOSIS — S46.912D STRAIN OF LEFT SHOULDER, SUBSEQUENT ENCOUNTER: Primary | ICD-10-CM

## 2024-04-11 PROCEDURE — 99212 OFFICE O/P EST SF 10 MIN: CPT | Performed by: ORTHOPAEDIC SURGERY

## 2024-04-11 ASSESSMENT — PATIENT HEALTH QUESTIONNAIRE - PHQ9
SUM OF ALL RESPONSES TO PHQ QUESTIONS 1-9: 0
2. FEELING DOWN, DEPRESSED OR HOPELESS: NOT AT ALL
SUM OF ALL RESPONSES TO PHQ QUESTIONS 1-9: 0
SUM OF ALL RESPONSES TO PHQ9 QUESTIONS 1 & 2: 0
1. LITTLE INTEREST OR PLEASURE IN DOING THINGS: NOT AT ALL
SUM OF ALL RESPONSES TO PHQ QUESTIONS 1-9: 0
SUM OF ALL RESPONSES TO PHQ QUESTIONS 1-9: 0

## 2024-04-11 NOTE — PROGRESS NOTES
4/11/2024      CC: Left shoulder weakness    HPI:      This is a 44 y.o. year old male who presents for a follow up visit.  The patient was last seen and diagnosed with left strain of shoulder.   The patient's treatments since the most recent visit have comprised of physical therapy.   The patient has had moderate relief of the chief complaint.  His range of motion has improved significantly, his pain is minimal and his weakness is improving.      PMH:  No past medical history on file.    PSxHx:  No past surgical history on file.    Meds:    Current Outpatient Medications:     ergocalciferol (ERGOCALCIFEROL) 1.25 MG (66781 UT) capsule, Take 1 capsule by mouth daily For 28 days, Disp: , Rfl:     tiZANidine (ZANAFLEX) 4 MG capsule, Take 1 capsule by mouth 3 times daily as needed for Muscle spasms (Patient not taking: Reported on 4/11/2024), Disp: 90 capsule, Rfl: 0    diclofenac (VOLTAREN) 75 MG EC tablet, Take 1 tablet by mouth 2 times daily (Patient not taking: Reported on 3/14/2024), Disp: 60 tablet, Rfl: 3    naproxen (NAPROSYN) 500 MG tablet, Take 1 tablet by mouth 2 times daily (with meals) (Patient not taking: Reported on 3/14/2024), Disp: 30 tablet, Rfl: 0    All:  No Known Allergies    Social Hx:  Social History     Socioeconomic History    Marital status: Single     Spouse name: None    Number of children: None    Years of education: None    Highest education level: None   Tobacco Use    Smoking status: Never    Smokeless tobacco: Never   Substance and Sexual Activity    Alcohol use: No    Drug use: Not Currently     Types: Marijuana (Weed)       Family Hx:  No family history on file.      Review of Systems:       General: Denies headache, lethargy, fever, weight loss  Ears/Nose/Throat: Denies ear discharge, drainage, nosebleeds, hoarse voice, dental problems  Cardiovascular: Denies chest pain, shortness of breath  Lungs: Denies chest pain, breathing problems, wheezing, pneumonia  Stomach: Denies stomach

## 2024-04-11 NOTE — PROGRESS NOTES
Identified pt with two pt identifiers (name and ). Reviewed chart in preparation for visit and have obtained necessary documentation.    Kumar Escobar is a 44 y.o. male Shoulder Pain (F/u Lt shoulder pain )  .    Vitals:    24 0845   Weight: 69.9 kg (154 lb)   Height: 1.702 m (5' 7\")          1. Have you been to the ER, urgent care clinic since your last visit?  Hospitalized since your last visit?  no     2. Have you seen or consulted any other health care providers outside of the [facility] since your last visit?  Include any pap smears or colon screening.  no

## 2024-04-30 ENCOUNTER — TELEPHONE (OUTPATIENT)
Age: 44
End: 2024-04-30
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
## 2024-05-01 ENCOUNTER — OFFICE VISIT (OUTPATIENT)
Age: 44
End: 2024-05-01
Payer: MEDICAID

## 2024-05-01 VITALS — BODY MASS INDEX: 24.17 KG/M2 | WEIGHT: 154 LBS | HEIGHT: 67 IN

## 2024-05-01 DIAGNOSIS — S46.912D STRAIN OF LEFT SHOULDER, SUBSEQUENT ENCOUNTER: Primary | ICD-10-CM

## 2024-05-01 DIAGNOSIS — S46.912A STRAIN OF LEFT SHOULDER, INITIAL ENCOUNTER: ICD-10-CM

## 2024-05-01 PROCEDURE — 99212 OFFICE O/P EST SF 10 MIN: CPT | Performed by: ORTHOPAEDIC SURGERY

## 2024-05-01 NOTE — PROGRESS NOTES
5/1/2024      CC: Left shoulder pain    HPI:      This is a 44 y.o. year old male who presents for a follow up visit.  The patient was last seen and diagnosed with right and left shoulder.   The patient's treatments since the most recent visit have comprised of physical therapy.   The patient has had moderate to good relief of the chief complaint.        PMH:  No past medical history on file.    PSxHx:  No past surgical history on file.    Meds:    Current Outpatient Medications:     ergocalciferol (ERGOCALCIFEROL) 1.25 MG (23740 UT) capsule, Take 1 capsule by mouth daily For 28 days, Disp: , Rfl:     tiZANidine (ZANAFLEX) 4 MG capsule, Take 1 capsule by mouth 3 times daily as needed for Muscle spasms (Patient not taking: Reported on 4/11/2024), Disp: 90 capsule, Rfl: 0    diclofenac (VOLTAREN) 75 MG EC tablet, Take 1 tablet by mouth 2 times daily (Patient not taking: Reported on 3/14/2024), Disp: 60 tablet, Rfl: 3    naproxen (NAPROSYN) 500 MG tablet, Take 1 tablet by mouth 2 times daily (with meals) (Patient not taking: Reported on 3/14/2024), Disp: 30 tablet, Rfl: 0    All:  No Known Allergies    Social Hx:  Social History     Socioeconomic History    Marital status: Single     Spouse name: None    Number of children: None    Years of education: None    Highest education level: None   Tobacco Use    Smoking status: Never    Smokeless tobacco: Never   Substance and Sexual Activity    Alcohol use: No    Drug use: Not Currently     Types: Marijuana (Weed)       Family Hx:  No family history on file.      Review of Systems:       General: Denies headache, lethargy, fever, weight loss  Ears/Nose/Throat: Denies ear discharge, drainage, nosebleeds, hoarse voice, dental problems  Cardiovascular: Denies chest pain, shortness of breath  Lungs: Denies chest pain, breathing problems, wheezing, pneumonia  Stomach: Denies stomach pain, heartburn, constipation, irritable bowel  Skin: Denies rash, sores, open

## 2024-05-01 NOTE — PROGRESS NOTES
Identified pt with two pt identifiers (name and ). Reviewed chart in preparation for visit and have obtained necessary documentation.    Kumar Escobar is a 44 y.o. male No chief complaint on file.  .    There were no vitals filed for this visit.       1. Have you been to the ER, urgent care clinic since your last visit?  Hospitalized since your last visit?  no     2. Have you seen or consulted any other health care providers outside of the [facility] since your last visit?  Include any pap smears or colon screening.  no

## 2024-06-17 ENCOUNTER — TELEPHONE (OUTPATIENT)
Age: 44
End: 2024-06-17

## 2024-06-19 ENCOUNTER — TELEPHONE (OUTPATIENT)
Age: 44
End: 2024-06-19

## 2024-06-19 NOTE — TELEPHONE ENCOUNTER
What kind of paperwork?  FMLA [employer]    2.Continuous leave or intermittent?  Continuous leave 5/30/24-6/24/24    3. Is patient aware of $35.00 charge to be collected prior to papers being sent out?  Paid Previously    4. Is patient willing to pay said charge?  N/A    5.Is patient aware of the 14 business day turn around for ALL paperwork?  Yes    6.Is a release signed and on file?  Yes    7.Has the patient completed their portion of the paperwork?  Yes    8. Date received  6/18/24 and hardcopy given to niurka plus copy in media

## 2024-06-20 ENCOUNTER — HOSPITAL ENCOUNTER (EMERGENCY)
Facility: HOSPITAL | Age: 44
Discharge: HOME | End: 2024-06-20
Payer: MEDICAID

## 2024-06-20 VITALS
HEART RATE: 63 BPM | TEMPERATURE: 97.7 F | RESPIRATION RATE: 16 BRPM | HEIGHT: 67 IN | BODY MASS INDEX: 24.33 KG/M2 | DIASTOLIC BLOOD PRESSURE: 66 MMHG | SYSTOLIC BLOOD PRESSURE: 106 MMHG | WEIGHT: 155 LBS | OXYGEN SATURATION: 99 %

## 2024-06-20 DIAGNOSIS — Z20.2 STD EXPOSURE: Primary | ICD-10-CM

## 2024-06-20 LAB
APPEARANCE UR: CLEAR
BACTERIA URNS QL MICRO: NEGATIVE /HPF
BILIRUB UR QL: NEGATIVE
COLOR UR: ABNORMAL
EPITH CASTS URNS QL MICRO: ABNORMAL /LPF
GLUCOSE UR STRIP.AUTO-MCNC: NEGATIVE MG/DL
HGB UR QL STRIP: NEGATIVE
KETONES UR QL STRIP.AUTO: ABNORMAL MG/DL
LEUKOCYTE ESTERASE UR QL STRIP.AUTO: ABNORMAL
NITRITE UR QL STRIP.AUTO: NEGATIVE
PH UR STRIP: 6 (ref 5–8)
PROT UR STRIP-MCNC: NEGATIVE MG/DL
RBC #/AREA URNS HPF: ABNORMAL /HPF (ref 0–5)
SP GR UR REFRACTOMETRY: 1.02
URINE CULTURE IF INDICATED: ABNORMAL
UROBILINOGEN UR QL STRIP.AUTO: 2 EU/DL (ref 0.2–1)
WBC URNS QL MICRO: ABNORMAL /HPF (ref 0–4)

## 2024-06-20 PROCEDURE — 81001 URINALYSIS AUTO W/SCOPE: CPT

## 2024-06-20 PROCEDURE — 2500000003 HC RX 250 WO HCPCS: Performed by: NURSE PRACTITIONER

## 2024-06-20 PROCEDURE — 87491 CHLMYD TRACH DNA AMP PROBE: CPT

## 2024-06-20 PROCEDURE — 6370000000 HC RX 637 (ALT 250 FOR IP): Performed by: NURSE PRACTITIONER

## 2024-06-20 PROCEDURE — 87591 N.GONORRHOEAE DNA AMP PROB: CPT

## 2024-06-20 PROCEDURE — 96372 THER/PROPH/DIAG INJ SC/IM: CPT

## 2024-06-20 PROCEDURE — 99284 EMERGENCY DEPT VISIT MOD MDM: CPT

## 2024-06-20 PROCEDURE — 6360000002 HC RX W HCPCS: Performed by: NURSE PRACTITIONER

## 2024-06-20 RX ORDER — AZITHROMYCIN 500 MG/1
1000 TABLET, FILM COATED ORAL
Status: COMPLETED | OUTPATIENT
Start: 2024-06-20 | End: 2024-06-20

## 2024-06-20 RX ADMIN — LIDOCAINE HYDROCHLORIDE 500 MG: 10 INJECTION, SOLUTION EPIDURAL; INFILTRATION; INTRACAUDAL; PERINEURAL at 14:42

## 2024-06-20 RX ADMIN — AZITHROMYCIN 1000 MG: 500 TABLET, FILM COATED ORAL at 14:42

## 2024-06-20 ASSESSMENT — PAIN - FUNCTIONAL ASSESSMENT: PAIN_FUNCTIONAL_ASSESSMENT: 0-10

## 2024-06-20 ASSESSMENT — PAIN SCALES - GENERAL: PAINLEVEL_OUTOF10: 0

## 2024-06-20 NOTE — DISCHARGE INSTRUCTIONS
It was a pleasure taking care of you at [facility] Emergency Department today.  We know that when you come to [facility], you are entrusting us with your health, comfort, and safety.  Our physicians and nurses honor that trust, and we truly appreciate the opportunity to care for you and your loved ones.      We also value our feedback.  If you receive a survey about your Emergency Department experience today, please fill it out.  We care about our patients' feedback, and we listen to what you have to say.  Thank you!

## 2024-06-20 NOTE — ED TRIAGE NOTES
Patient reports receiving a phone call from a previous partner stating she tested positive for chlamydia and gonorrhea. He is asymptomatic.

## 2024-06-20 NOTE — ED PROVIDER NOTES
[facility] EMERGENCY DEPT  EMERGENCY DEPARTMENT ENCOUNTER       Pt Name: Kumar Escobar  MRN: [id number]  Birthdate 1980  Date of evaluation: 6/20/2024  Provider: ELDER Niño NP   PCP: No primary care provider on file.  Note Started: 3:52 PM EDT 6/20/24     CHIEF COMPLAINT       Chief Complaint   Patient presents with    Exposure to STD        HISTORY OF PRESENT ILLNESS: 1 or more elements      History From: Patient  HPI Limitations: None     Kumar Escobar is a 44 y.o. male who presents for STD exposure. Pt reports being informed by sexual partner that he was exposed to gonorrhea and chlamydia. He denies penile d/c, dysuria, lesions. He reports not being active for \" months\" due to orthopedic injury and decided to have sex without condoms      Nursing Notes were all reviewed and agreed with or any disagreements were addressed in the HPI.     REVIEW OF SYSTEMS      Review of Systems     Positives and Pertinent negatives as per HPI.    PAST HISTORY     Past Medical History:  History reviewed. No pertinent past medical history.    Past Surgical History:  History reviewed. No pertinent surgical history.    Family History:  History reviewed. No pertinent family history.    Social History:  Social History     Tobacco Use    Smoking status: Never    Smokeless tobacco: Never   Substance Use Topics    Alcohol use: No    Drug use: Yes     Types: Marijuana (Weed)       Allergies:  No Known Allergies    CURRENT MEDICATIONS      Discharge Medication List as of 6/20/2024  3:15 PM        CONTINUE these medications which have NOT CHANGED    Details   ergocalciferol (ERGOCALCIFEROL) 1.25 MG (68160 UT) capsule Take 1 capsule by mouth daily For 28 daysHistorical Med      tiZANidine (ZANAFLEX) 4 MG capsule Take 1 capsule by mouth 3 times daily as needed for Muscle spasms, Disp-90 capsule, R-0Normal      diclofenac (VOLTAREN) 75 MG EC tablet Take 1 tablet by mouth 2 times daily, Disp-60 tablet, R-3Normal      naproxen

## 2024-06-20 NOTE — ED NOTES
Discharge instructions were given to the patient by provider.     The patient left the Emergency Department alert and oriented and in no acute distress with 0 prescriptions. The patient was encouraged to call or return to the ED for worsening issues or problems and was encouraged to schedule a follow up appointment for continuing care.     Ambulation assessment completed before discharge.  Pt left Emergency Department ambulating at baseline with no ortho devices  Ortho device education: none    The patient verbalized understanding of discharge instructions and prescriptions, all questions were answered. The patient has no further concerns at this time.

## 2024-06-20 NOTE — ED NOTES
Pt presents ambulatory to ED complaining of concern for STD after receiving phone call reporting that his sexual partner tested positive for Chlamydia and Gonorrhea. Pt denies symptoms at this time. Pt is alert and oriented x 4, RR even and unlabored, skin is warm and dry. Assesment completed and pt updated on plan of care.       Emergency Department Nursing Plan of Care       The Nursing Plan of Care is developed from the Nursing assessment and Emergency Department Attending provider initial evaluation.  The plan of care may be reviewed in the “ED Provider note”.    The Plan of Care was developed with the following considerations:   Patient / Family readiness to learn indicated by:verbalized understanding  Persons(s) to be included in education: patient  Barriers to Learning/Limitations:None    Signed

## 2024-06-21 ENCOUNTER — OFFICE VISIT (OUTPATIENT)
Age: 44
End: 2024-06-21
Payer: MEDICAID

## 2024-06-21 DIAGNOSIS — S46.912D STRAIN OF LEFT SHOULDER, SUBSEQUENT ENCOUNTER: Primary | ICD-10-CM

## 2024-06-21 PROCEDURE — 99212 OFFICE O/P EST SF 10 MIN: CPT | Performed by: ORTHOPAEDIC SURGERY

## 2024-06-21 NOTE — PROGRESS NOTES
6/21/2024      CC: Left shoulder strain    HPI:      This is a 44 y.o. year old male who presents for a follow up visit.  The patient was last seen and diagnosed with left shoulder strain.   The patient's treatments since the most recent visit have comprised of physical therapy.   The patient has had good relief of the chief complaint.        PMH:  No past medical history on file.    PSxHx:  No past surgical history on file.    Meds:    Current Outpatient Medications:     ergocalciferol (ERGOCALCIFEROL) 1.25 MG (03680 UT) capsule, Take 1 capsule by mouth daily For 28 days, Disp: , Rfl:     tiZANidine (ZANAFLEX) 4 MG capsule, Take 1 capsule by mouth 3 times daily as needed for Muscle spasms (Patient not taking: Reported on 4/11/2024), Disp: 90 capsule, Rfl: 0    diclofenac (VOLTAREN) 75 MG EC tablet, Take 1 tablet by mouth 2 times daily (Patient not taking: Reported on 3/14/2024), Disp: 60 tablet, Rfl: 3    naproxen (NAPROSYN) 500 MG tablet, Take 1 tablet by mouth 2 times daily (with meals) (Patient not taking: Reported on 3/14/2024), Disp: 30 tablet, Rfl: 0    All:  No Known Allergies    Social Hx:  Social History     Socioeconomic History    Marital status: Single     Spouse name: None    Number of children: None    Years of education: None    Highest education level: None   Tobacco Use    Smoking status: Never    Smokeless tobacco: Never   Substance and Sexual Activity    Alcohol use: No    Drug use: Yes     Types: Marijuana (Weed)       Family Hx:  No family history on file.      Review of Systems:       General: Denies headache, lethargy, fever, weight loss  Ears/Nose/Throat: Denies ear discharge, drainage, nosebleeds, hoarse voice, dental problems  Cardiovascular: Denies chest pain, shortness of breath  Lungs: Denies chest pain, breathing problems, wheezing, pneumonia  Stomach: Denies stomach pain, heartburn, constipation, irritable bowel  Skin: Denies rash, sores, open wounds  Musculoskeletal: Improved left

## 2024-06-22 LAB
C TRACH DNA SPEC QL NAA+PROBE: NEGATIVE
N GONORRHOEA DNA SPEC QL NAA+PROBE: NEGATIVE
SAMPLE TYPE: NORMAL
SERVICE CMNT-IMP: NORMAL
SPECIMEN SOURCE: NORMAL

## 2024-09-27 ENCOUNTER — TELEPHONE (OUTPATIENT)
Age: 44
End: 2024-09-27

## 2024-10-20 ENCOUNTER — HOSPITAL ENCOUNTER (EMERGENCY)
Facility: HOSPITAL | Age: 44
Discharge: LEFT BEFORE DISCHARGE | End: 2024-10-20

## 2024-10-20 VITALS
OXYGEN SATURATION: 100 % | BODY MASS INDEX: 24.33 KG/M2 | DIASTOLIC BLOOD PRESSURE: 50 MMHG | TEMPERATURE: 97.5 F | WEIGHT: 155 LBS | HEART RATE: 84 BPM | SYSTOLIC BLOOD PRESSURE: 97 MMHG | HEIGHT: 67 IN | RESPIRATION RATE: 18 BRPM

## 2024-10-20 ASSESSMENT — PAIN - FUNCTIONAL ASSESSMENT: PAIN_FUNCTIONAL_ASSESSMENT: NONE - DENIES PAIN

## 2024-10-20 NOTE — ED TRIAGE NOTES
Pt to er for c/o erectile dysfunction after being treated for trich 2 weeks ago. Pt states he would like to be retested.